# Patient Record
Sex: MALE | Race: BLACK OR AFRICAN AMERICAN | NOT HISPANIC OR LATINO | URBAN - METROPOLITAN AREA
[De-identification: names, ages, dates, MRNs, and addresses within clinical notes are randomized per-mention and may not be internally consistent; named-entity substitution may affect disease eponyms.]

---

## 2024-11-21 ENCOUNTER — EMERGENCY (EMERGENCY)
Facility: HOSPITAL | Age: 24
LOS: 1 days | Discharge: HOME | End: 2024-11-21
Admitting: STUDENT IN AN ORGANIZED HEALTH CARE EDUCATION/TRAINING PROGRAM
Payer: MEDICAID

## 2024-11-21 VITALS
OXYGEN SATURATION: 96 % | DIASTOLIC BLOOD PRESSURE: 75 MMHG | HEART RATE: 92 BPM | SYSTOLIC BLOOD PRESSURE: 122 MMHG | RESPIRATION RATE: 18 BRPM | TEMPERATURE: 98 F

## 2024-11-21 PROCEDURE — 99283 EMERGENCY DEPT VISIT LOW MDM: CPT

## 2024-11-21 NOTE — ED ADULT TRIAGE NOTE - CHIEF COMPLAINT QUOTE
BIBA from shelter with complaints of med eval after possibly taking more promethazine syrup than he was supposed to. Pt is aox4 and reports hes been taking it for a week for a cough. States he's also prescribed xanax but did not take that today. Pt presents slightly sleepy in triage but is able to answer all questions appropriately.

## 2024-11-22 NOTE — ED PROVIDER NOTE - PATIENT PORTAL LINK FT
You can access the FollowMyHealth Patient Portal offered by [facility] by registering at the following website: [email or web address]. By joining [employer]’s FollowMyHealth portal, you will also be able to view your health information using other applications (apps) compatible with our system.

## 2024-11-22 NOTE — ED PROVIDER NOTE - OBJECTIVE STATEMENT
24-year-old male, past medical history of anxiety, presents to the ED requesting refill of his anxiety medication.  Patient states he takes Xanax.  He denies having other medical complaints.

## 2024-11-22 NOTE — ED PROVIDER NOTE - PHYSICAL EXAMINATION
VITAL SIGNS: I have reviewed nursing notes and confirm.  CONSTITUTIONAL: Well-developed; well-nourished; in no acute distress.  SKIN: No acute rash.  HEENT: Normocephalic; atraumatic.  CARD: No extremity cyanosis.  RESP: Speaks in full, clear sentences.  EXT: Moves all extremities normally.  NEURO: Alert, oriented. Grossly unremarkable. No focal deficits. Fluent speech.   PSYCH: Cooperative, appropriate. Mood and affect wnl.

## 2024-11-22 NOTE — ED PROVIDER NOTE - CLINICAL SUMMARY MEDICAL DECISION MAKING FREE TEXT BOX
24-year-old male, past medical history of anxiety, presents to the ED requesting refill of his anxiety medication.  I explained to patient that we do not prescribe benzodiazepines from the emergency room, however he can follow-up with her psychiatrist or primary care doctor, to discuss with receiving a refill from them.  Also after reviewing the triage note, I asked patient if he had any concern for recent promethazine ingestion, however patient states he does not have a complaint for that and otherwise denies any other medical issues.  He is requesting discharge papers so that he can go.  Will plan to the patient for discharge, he is given return precautions and is stable as he leaves.

## 2024-11-25 DIAGNOSIS — F41.9 ANXIETY DISORDER, UNSPECIFIED: ICD-10-CM

## 2024-12-16 ENCOUNTER — EMERGENCY (EMERGENCY)
Facility: HOSPITAL | Age: 24
LOS: 1 days | Discharge: HOME | End: 2024-12-16
Attending: STUDENT IN AN ORGANIZED HEALTH CARE EDUCATION/TRAINING PROGRAM | Admitting: STUDENT IN AN ORGANIZED HEALTH CARE EDUCATION/TRAINING PROGRAM
Payer: MEDICAID

## 2024-12-16 VITALS
OXYGEN SATURATION: 97 % | DIASTOLIC BLOOD PRESSURE: 55 MMHG | RESPIRATION RATE: 14 BRPM | HEART RATE: 62 BPM | WEIGHT: 149.91 LBS | TEMPERATURE: 98 F | SYSTOLIC BLOOD PRESSURE: 97 MMHG

## 2024-12-16 PROCEDURE — 99283 EMERGENCY DEPT VISIT LOW MDM: CPT

## 2024-12-16 NOTE — ED PROVIDER NOTE - NSICDXNOPASTMEDICALHX_GEN_ALL_ED
Problem: Falls - Risk of  Goal: *Absence of Falls  Document Shira Fall Risk and appropriate interventions in the flowsheet.    Outcome: Progressing Towards Goal  Fall Risk Interventions:            Medication Interventions: Bed/chair exit alarm, Patient to call before getting OOB, Teach patient to arise slowly    Elimination Interventions: Bed/chair exit alarm, Call light in reach, Patient to call for help with toileting needs, Toileting schedule/hourly rounds    History of Falls Interventions: Bed/chair exit alarm, Room close to nurse's station <-- Click to add NO pertinent Past Medical History

## 2024-12-16 NOTE — ED PROVIDER NOTE - NSFOLLOWUPINSTRUCTIONS_ED_ALL_ED_FT
Please stop using cannabis and oxycodone.    Please know that this evaluation is incomplete until you have followed up on today's findings with a primary care or specialist physician.    Thank you and feel better soon.

## 2024-12-16 NOTE — ED PROVIDER NOTE - OBJECTIVE STATEMENT
24-year-old male no past medical history now presents for PSA intoxication.  Patient reports taking cannabis, oxycodone today.  He was found to be somnolent at shelter.  As such, patient was brought in by EMS for evaluation.  Patient cannot state medications and drugs that he took today.  He denies headache, chest pain, abdominal pain.  Patient has no fevers, chills, nausea, vomiting, diarrhea.  Patient has no shortness of breath.  Patient has no current complaints.

## 2024-12-16 NOTE — ED PROVIDER NOTE - CLINICAL SUMMARY MEDICAL DECISION MAKING FREE TEXT BOX
24-year-old male no past medical history now presents for PSA intoxication. Exam with psychomotor depression. Concern for opioid intoxication. Plan to allow patient to metabolize. Anticipate dispo to self care. 24-year-old male no past medical history now presents for PSA intoxication. Exam with psychomotor depression. Concern for opioid intoxication. Plan to allow patient to metabolize. Anticipate dispo to self care.  --  Pt clinically sober; chatting on the phone; able to ambulate; will d/c to self care

## 2024-12-16 NOTE — ED ADULT NURSE REASSESSMENT NOTE - NS ED NURSE REASSESS COMMENT FT1
Pt discharged by provider, A=Ox4, able to ambulate w/ steady gait, no LDA's present, in possession of all belongings.

## 2024-12-16 NOTE — ED ADULT NURSE NOTE - OBJECTIVE STATEMENT
Pt came in c/o ams after taking x2 percocet and x2 oxycodone pills x today. Pt is responsive to voice. Respirations even and unlabored. No s/s of acute trauma.

## 2024-12-16 NOTE — ED PROVIDER NOTE - PHYSICAL EXAMINATION
VITAL SIGNS: I have reviewed nursing notes and confirm.  CONST: Well-developed; well-nourished; No apparent distress.  ENT: No nasal discharge; airway clear.  EYES: Sclera clear. Pupils round and symmetrical bilaterally.  RESP: Breathing comfortably; speaking in full sentences.   MSK: No acute deformities noted to extremities. No tenderness to cervical/thoracic/lumbar spine to palpation.  NEURO: Alert, oriented. Speech is fluent and appropriate. Moving all extremities appropriately. No gross motor or sensory abnormalities.  SKIN: Skin is normal temperature; no diaphoresis; no pallor.  PSYCH: Cooperative. Appropriate mood, language, and behavior. Psychomotor depression

## 2024-12-16 NOTE — ED ADULT NURSE NOTE - NSFALLUNIVINTERV_ED_ALL_ED
Bed/Stretcher in lowest position, wheels locked, appropriate side rails in place/Call bell, personal items and telephone in reach/Instruct patient to call for assistance before getting out of bed/chair/stretcher/Non-slip footwear applied when patient is off stretcher/[place] to call system/Physically safe environment - no spills, clutter or unnecessary equipment/Purposeful proactive rounding/Room/bathroom lighting operational, light cord in reach

## 2024-12-16 NOTE — ED PROVIDER NOTE - PATIENT PORTAL LINK FT
You can access the FollowMyHealth Patient Portal offered by [facility] by registering at the following website: [email or web address]. By joining [employer]’s FollowMyHealth portal, you will also be able to view your health information using other applications (apps) compatible with our system.

## 2024-12-16 NOTE — ED ADULT TRIAGE NOTE - CHIEF COMPLAINT QUOTE
Pt BIBA from shelter after was found altered. Pt admitted to taking 2x Percocet and 2x oxycodone pills. Responsive to voice on arrival. Breathing WNL

## 2024-12-18 DIAGNOSIS — F11.929 OPIOID USE, UNSPECIFIED WITH INTOXICATION, UNSPECIFIED: ICD-10-CM

## 2024-12-24 ENCOUNTER — HOSPITAL ENCOUNTER (INPATIENT)
Dept: HOSPITAL 74 - YASAS | Age: 24
LOS: 6 days | Discharge: HOME | DRG: 773 | End: 2024-12-30
Attending: SURGERY | Admitting: ALLERGY & IMMUNOLOGY
Payer: COMMERCIAL

## 2024-12-24 VITALS — BODY MASS INDEX: 18.9 KG/M2

## 2024-12-24 DIAGNOSIS — Z59.01: ICD-10-CM

## 2024-12-24 DIAGNOSIS — F13.230: Primary | ICD-10-CM

## 2024-12-24 DIAGNOSIS — Z86.73: ICD-10-CM

## 2024-12-24 DIAGNOSIS — R63.6: ICD-10-CM

## 2024-12-24 DIAGNOSIS — F11.20: ICD-10-CM

## 2024-12-24 DIAGNOSIS — F17.213: ICD-10-CM

## 2024-12-24 DIAGNOSIS — F41.9: ICD-10-CM

## 2024-12-24 DIAGNOSIS — F19.280: ICD-10-CM

## 2024-12-24 PROCEDURE — HZ2ZZZZ DETOXIFICATION SERVICES FOR SUBSTANCE ABUSE TREATMENT: ICD-10-PCS | Performed by: SURGERY

## 2024-12-24 RX ADMIN — Medication SCH MG: at 22:16

## 2024-12-24 RX ADMIN — LEVETIRACETAM SCH MG: 500 TABLET, FILM COATED ORAL at 22:16

## 2024-12-24 SDOH — ECONOMIC STABILITY - HOUSING INSECURITY: SHELTERED HOMELESSNESS: Z59.01

## 2024-12-25 LAB
ALBUMIN SERPL-MCNC: 3.6 G/DL (ref 3.4–5)
ALP SERPL-CCNC: 82 U/L (ref 45–117)
ALT SERPL-CCNC: 24 U/L (ref 13–61)
ANION GAP SERPL CALC-SCNC: 5 MMOL/L (ref 4–13)
AST SERPL-CCNC: 23 U/L (ref 15–37)
BILIRUB SERPL-MCNC: 0.6 MG/DL (ref 0.2–1)
BUN SERPL-MCNC: 14.3 MG/DL (ref 7–18)
CALCIUM SERPL-MCNC: 8.7 MG/DL (ref 8.5–10.1)
CHLORIDE SERPL-SCNC: 101 MMOL/L (ref 98–107)
CO2 SERPL-SCNC: 33 MMOL/L (ref 21–32)
CREAT SERPL-MCNC: 1 MG/DL (ref 0.55–1.3)
DEPRECATED RDW RBC AUTO: 14.3 % (ref 11.9–15.9)
GLUCOSE SERPL-MCNC: 92 MG/DL (ref 74–106)
HCT VFR BLD CALC: 41.7 % (ref 35.4–49)
HGB BLD-MCNC: 13.6 GM/DL (ref 11.7–16.9)
MCH RBC QN AUTO: 25.9 PG (ref 25.7–33.7)
MCHC RBC AUTO-ENTMCNC: 32.5 G/DL (ref 32–35.9)
MCV RBC: 79.6 FL (ref 80–96)
PLATELET # BLD AUTO: 166 10^3/UL (ref 134–434)
PMV BLD: 8.7 FL (ref 7.5–11.1)
POTASSIUM SERPLBLD-SCNC: 3.6 MMOL/L (ref 3.5–5.1)
PROT SERPL-MCNC: 6.3 G/DL (ref 6.4–8.2)
RBC # BLD AUTO: 5.24 M/MM3 (ref 4–5.6)
SODIUM SERPL-SCNC: 139 MMOL/L (ref 136–145)
WBC # BLD AUTO: 4.6 K/MM3 (ref 4–10)

## 2024-12-25 RX ADMIN — IBUPROFEN PRN MG: 600 TABLET, FILM COATED ORAL at 09:29

## 2024-12-25 RX ADMIN — Medication SCH MG: at 22:19

## 2024-12-25 RX ADMIN — Medication SCH TAB: at 09:28

## 2024-12-25 RX ADMIN — METHOCARBAMOL PRN MG: 500 TABLET ORAL at 09:27

## 2024-12-26 RX ADMIN — QUETIAPINE FUMARATE SCH MG: 100 TABLET ORAL at 22:39

## 2024-12-29 VITALS — TEMPERATURE: 98 F

## 2024-12-29 RX ADMIN — NALOXONE HYDROCHLORIDE SCH: 4 SPRAY NASAL at 14:40

## 2024-12-29 RX ADMIN — ACETAMINOPHEN PRN MG: 325 TABLET ORAL at 15:28

## 2024-12-30 VITALS — DIASTOLIC BLOOD PRESSURE: 60 MMHG | RESPIRATION RATE: 17 BRPM | HEART RATE: 76 BPM | SYSTOLIC BLOOD PRESSURE: 97 MMHG

## 2025-01-16 ENCOUNTER — EMERGENCY (EMERGENCY)
Age: 25
LOS: 1 days | Discharge: HOME | End: 2025-01-16
Attending: EMERGENCY MEDICINE | Admitting: EMERGENCY MEDICINE
Payer: MEDICAID

## 2025-01-16 VITALS
HEART RATE: 80 BPM | SYSTOLIC BLOOD PRESSURE: 120 MMHG | DIASTOLIC BLOOD PRESSURE: 73 MMHG | RESPIRATION RATE: 16 BRPM | TEMPERATURE: 97 F | OXYGEN SATURATION: 97 %

## 2025-01-16 VITALS
RESPIRATION RATE: 15 BRPM | OXYGEN SATURATION: 97 % | DIASTOLIC BLOOD PRESSURE: 57 MMHG | SYSTOLIC BLOOD PRESSURE: 98 MMHG | HEART RATE: 96 BPM

## 2025-01-16 DIAGNOSIS — R41.82 ALTERED MENTAL STATUS, UNSPECIFIED: ICD-10-CM
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
## 2025-01-16 DIAGNOSIS — Z59.01 SHELTERED HOMELESSNESS: ICD-10-CM
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
## 2025-01-16 DIAGNOSIS — T40.2X1A POISONING BY OTHER OPIOIDS, ACCIDENTAL (UNINTENTIONAL), INITIAL ENCOUNTER: ICD-10-CM

## 2025-01-16 PROBLEM — Z78.9 OTHER SPECIFIED HEALTH STATUS: Chronic | Status: ACTIVE | Noted: 2024-12-16

## 2025-01-16 PROCEDURE — 99222 1ST HOSP IP/OBS MODERATE 55: CPT

## 2025-01-16 PROCEDURE — 99053 MED SERV 10PM-8AM 24 HR FAC: CPT

## 2025-01-16 SDOH — ECONOMIC STABILITY - HOUSING INSECURITY: SHELTERED HOMELESSNESS: Z59.01
